# Patient Record
Sex: MALE | URBAN - METROPOLITAN AREA
[De-identification: names, ages, dates, MRNs, and addresses within clinical notes are randomized per-mention and may not be internally consistent; named-entity substitution may affect disease eponyms.]

---

## 2017-10-07 ENCOUNTER — EMERGENCY (EMERGENCY)
Facility: HOSPITAL | Age: 27
LOS: 0 days | Discharge: HOME | End: 2017-10-08
Attending: EMERGENCY MEDICINE
Payer: COMMERCIAL

## 2017-10-07 VITALS
TEMPERATURE: 99 F | WEIGHT: 195.11 LBS | HEIGHT: 69 IN | RESPIRATION RATE: 15 BRPM | DIASTOLIC BLOOD PRESSURE: 102 MMHG | SYSTOLIC BLOOD PRESSURE: 136 MMHG | HEART RATE: 57 BPM | OXYGEN SATURATION: 99 %

## 2017-10-07 DIAGNOSIS — M25.532 PAIN IN LEFT WRIST: ICD-10-CM

## 2017-10-07 DIAGNOSIS — Z91.013 ALLERGY TO SEAFOOD: ICD-10-CM

## 2017-10-07 DIAGNOSIS — S60.912A UNSPECIFIED SUPERFICIAL INJURY OF LEFT WRIST, INITIAL ENCOUNTER: ICD-10-CM
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
## 2017-10-07 PROCEDURE — 29125 APPL SHORT ARM SPLINT STATIC: CPT

## 2017-10-07 PROCEDURE — 99283 EMERGENCY DEPT VISIT LOW MDM: CPT | Mod: 25
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
## 2017-10-08 VITALS
SYSTOLIC BLOOD PRESSURE: 141 MMHG | RESPIRATION RATE: 18 BRPM | DIASTOLIC BLOOD PRESSURE: 77 MMHG | TEMPERATURE: 98 F | HEART RATE: 63 BPM | OXYGEN SATURATION: 100 %

## 2017-10-08 PROCEDURE — 73110 X-RAY EXAM OF WRIST: CPT | Mod: 26,LT

## 2017-10-08 RX ORDER — TETANUS TOXOID, REDUCED DIPHTHERIA TOXOID AND ACELLULAR PERTUSSIS VACCINE, ADSORBED 5; 2.5; 8; 8; 2.5 [IU]/.5ML; [IU]/.5ML; UG/.5ML; UG/.5ML; UG/.5ML
0.5 SUSPENSION INTRAMUSCULAR ONCE
Refills: 0 | Status: COMPLETED | OUTPATIENT
Start: 2017-10-08 | End: 2017-10-08

## 2017-10-08 RX ORDER — IBUPROFEN 200 MG
1 TABLET ORAL
Qty: 15 | Refills: 0
Start: 2017-10-08 | End: 2017-10-13

## 2017-10-08 RX ORDER — IBUPROFEN 200 MG
600 TABLET ORAL ONCE
Refills: 0 | Status: COMPLETED | OUTPATIENT
Start: 2017-10-08 | End: 2017-10-08

## 2017-10-08 RX ADMIN — TETANUS TOXOID, REDUCED DIPHTHERIA TOXOID AND ACELLULAR PERTUSSIS VACCINE, ADSORBED 0.5 MILLILITER(S): 5; 2.5; 8; 8; 2.5 SUSPENSION INTRAMUSCULAR at 01:14

## 2017-10-08 RX ADMIN — Medication 600 MILLIGRAM(S): at 01:14

## 2017-10-08 NOTE — ED PROVIDER NOTE - PHYSICAL EXAMINATION
Gen: Alert, NAD  Head: NC, AT   Eyes: PERRL, EOMI, normal lids/conjunctiva  ENT: normal hearing, patent oropharynx without erythema/exudate, uvula midline  Neck: supple, no tenderness, Trachea midline  Pulm: Bilateral BS, normal resp effort, no wheeze/stridor/retractions  CV: RRR, no M/R/G, 2+ radial and dp pulses bl, no edema  Abd: soft, NT/ND, +BS, no hepatosplenomegaly  Mskel: left wrist normal range of motion but with pain on the dorso-radial side. there is positive snuff box ttp   Skin: there is an abrasion and blood blister on the volar side of the left wrist  Neuro: AAOx3, no sensory/motor deficits, CN 2-12 intact
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
## 2020-11-23 ENCOUNTER — TRANSCRIPTION ENCOUNTER (OUTPATIENT)
Age: 30
End: 2020-11-23

## 2021-02-06 NOTE — ED ADULT NURSE NOTE - CCCP TRG CHIEF CMPLNT
Problem: General Rehab Plan of Care  Goal: Occupational Therapy Goals  Description: The patient and/or their representative will achieve their patient-specific goals related to the plan of care.  The patient-specific goals include:    Interventions to focus on decreasing symptoms of depression,  decreasing self-injurious behaviors, elimination of suicidal ideation and elevation of mood. Additional interventions to focus on identifying and managing feelings, stress management, exercise, and healthy coping skills.     Pt actively participated in a structured occupational therapy group of 4 patients total with a focus on coping through task x60 min. Pt participated in check in, working to think of a happy memory and label items that fit under the 5 senses in that memory as a way to practice 5 senses grounding. Pt was able to ask for assistance as needed, and independently initiate self-selected task-edwardo and decorating a g=box with washi tape. Pt demonstrated good focus, planning, and problem solving. Pt appeared comfortable interacting with peers but continues to appear more withdrawn/quiet than previous groups. Neutral affect.    Outcome: No Change      pain, wrist